# Patient Record
Sex: FEMALE | Race: WHITE | URBAN - METROPOLITAN AREA
[De-identification: names, ages, dates, MRNs, and addresses within clinical notes are randomized per-mention and may not be internally consistent; named-entity substitution may affect disease eponyms.]

---

## 2021-07-29 ENCOUNTER — TELEPHONE ENCOUNTER (OUTPATIENT)
Dept: URBAN - METROPOLITAN AREA CLINIC 92 | Facility: CLINIC | Age: 56
End: 2021-07-29

## 2021-07-29 RX ORDER — ONDANSETRON 8 MG/1
TAKE 1 TABLET (8 MG) PO Q 6 PRN TABLET, ORALLY DISINTEGRATING ORAL
Qty: 20 | Refills: 3 | Status: ACTIVE | COMMUNITY
Start: 2017-05-05 | End: 1900-01-01

## 2021-07-29 RX ORDER — MESALAMINE 1.2 G/1
TAKE 4 TABLETS (4.8 GRAM) BY ORAL ROUTE ONCE DAILY WITH A MEAL FOR 90 DAYS TABLET, DELAYED RELEASE ORAL 1
Qty: 360 | Refills: 3 | Status: ACTIVE | COMMUNITY
Start: 2018-02-08 | End: 1900-01-01

## 2021-07-29 RX ORDER — SODIUM SULFATE, MAGNESIUM SULFATE, AND POTASSIUM CHLORIDE 17.75; 2.7; 2.25 G/1; G/1; G/1
12 TABLETS TABLET ORAL
Qty: 24 TABLETS | Refills: 0 | OUTPATIENT
Start: 2021-07-29 | End: 2021-07-30

## 2021-09-01 LAB
A/G RATIO: 1.7
ALBUMIN: 4.3
ALKALINE PHOSPHATASE: 67
ALT (SGPT): 17
AST (SGOT): 23
BILIRUBIN, TOTAL: 0.4
BUN/CREATININE RATIO: 23
BUN: 16
C-REACTIVE PROTEIN, QUANT: 2
CALCIUM: 9.5
CARBON DIOXIDE, TOTAL: 23
CHLORIDE: 103
CREATININE: 0.7
EGFR IF AFRICN AM: 113
EGFR IF NONAFRICN AM: 98
FERRITIN, SERUM: 40
GLOBULIN, TOTAL: 2.6
GLUCOSE: 94
HEMATOCRIT: 38.1
HEMOGLOBIN: 12.1
IRON BIND.CAP.(TIBC): 325
IRON SATURATION: 22
IRON: 72
MCH: 29.3
MCHC: 31.8
MCV: 92
NRBC: (no result)
PLATELETS: 422
POTASSIUM: 4.8
PROTEIN, TOTAL: 6.9
RBC: 4.13
RDW: 12.8
SODIUM: 140
UIBC: 253
VITAMIN B12: 349
VITAMIN D, 25-HYDROXY: 22.8
WBC: 6.3
ZINC, PLASMA OR SERUM: 108

## 2021-09-14 ENCOUNTER — OFFICE VISIT (OUTPATIENT)
Dept: URBAN - METROPOLITAN AREA CLINIC 98 | Facility: CLINIC | Age: 56
End: 2021-09-14

## 2021-11-04 ENCOUNTER — OFFICE VISIT (OUTPATIENT)
Dept: URBAN - METROPOLITAN AREA CLINIC 98 | Facility: CLINIC | Age: 56
End: 2021-11-04
Payer: COMMERCIAL

## 2021-11-04 ENCOUNTER — WEB ENCOUNTER (OUTPATIENT)
Dept: URBAN - METROPOLITAN AREA CLINIC 98 | Facility: CLINIC | Age: 56
End: 2021-11-04

## 2021-11-04 DIAGNOSIS — K51.00 ULCERATIVE PANCOLITIS WITHOUT COMPLICATION: ICD-10-CM

## 2021-11-04 PROCEDURE — 99214 OFFICE O/P EST MOD 30 MIN: CPT | Performed by: INTERNAL MEDICINE

## 2021-11-04 RX ORDER — ONDANSETRON 8 MG/1
TAKE 1 TABLET (8 MG) PO Q 6 PRN TABLET, ORALLY DISINTEGRATING ORAL
Qty: 20 | Refills: 3 | Status: DISCONTINUED | COMMUNITY
Start: 2017-05-05

## 2021-11-04 RX ORDER — SODIUM, POTASSIUM,MAG SULFATES 17.5-3.13G
354ML SOLUTION, RECONSTITUTED, ORAL ORAL
Qty: 345 MILLILITER | Refills: 0 | OUTPATIENT
Start: 2021-11-07 | End: 2021-11-07

## 2021-11-04 RX ORDER — DICYCLOMINE HYDROCHLORIDE 20 MG/1
1 TABLET TABLET ORAL THREE TIMES A DAY
Qty: 90 | Refills: 3 | OUTPATIENT
Start: 2021-11-07 | End: 2022-03-06

## 2021-11-04 RX ORDER — MESALAMINE 1.2 G/1
2 TABLETS TABLET, DELAYED RELEASE ORAL ONCE A DAY
Qty: 60 | Refills: 3 | OUTPATIENT
Start: 2021-11-07 | End: 2022-03-06

## 2021-11-04 RX ORDER — MESALAMINE 1.2 G/1
TAKE 4 TABLETS (4.8 GRAM) BY ORAL ROUTE ONCE DAILY WITH A MEAL FOR 90 DAYS TABLET, DELAYED RELEASE ORAL 1
Qty: 360 | Refills: 3 | Status: DISCONTINUED | COMMUNITY
Start: 2018-02-08

## 2021-11-04 NOTE — HPI-TODAY'S VISIT:
54 yo pt w chronic pan-ulcerative colitis, on no Rx who has been experiencing intermittent loose, non-bloody stools w/o melenic stools mild intermittent lower abdominal cramping pain, wo fever, chill, night sweats and no anorexia or weight loss. Recent labs 7/21: all normal. She had an episode of N/V and diarrhea last week, which is resolving. Has chronic LBP on Lidoderm qd. She has no other complaints. No COVID-19 exposure and has received 2 doses of COVID-19 vaccine. No Flu vaccine as yet. No other complaints.

## 2021-11-15 ENCOUNTER — TELEPHONE ENCOUNTER (OUTPATIENT)
Dept: URBAN - METROPOLITAN AREA CLINIC 98 | Facility: CLINIC | Age: 56
End: 2021-11-15

## 2021-11-23 ENCOUNTER — ERX REFILL RESPONSE (OUTPATIENT)
Dept: URBAN - METROPOLITAN AREA CLINIC 98 | Facility: CLINIC | Age: 56
End: 2021-11-23

## 2021-11-23 RX ORDER — DICYCLOMINE HYDROCHLORIDE 20 MG/1
TAKE 1 TABLET BY MOUTH THREE TIMES A DAY FOR 30 DAYS TABLET ORAL
Qty: 270 TABLET | Refills: 2 | OUTPATIENT

## 2021-11-23 RX ORDER — DICYCLOMINE HYDROCHLORIDE 20 MG/1
1 TABLET TABLET ORAL THREE TIMES A DAY
Qty: 90 | Refills: 3 | OUTPATIENT

## 2022-11-16 ENCOUNTER — TELEPHONE ENCOUNTER (OUTPATIENT)
Dept: URBAN - METROPOLITAN AREA CLINIC 92 | Facility: CLINIC | Age: 57
End: 2022-11-16

## 2022-11-16 RX ORDER — DICYCLOMINE HYDROCHLORIDE 20 MG/1
TAKE 1 TABLET BY MOUTH THREE TIMES A DAY FOR 30 DAYS TABLET ORAL
Qty: 270 TABLET | Refills: 2 | COMMUNITY

## 2022-11-16 RX ORDER — SODIUM, POTASSIUM,MAG SULFATES 17.5-3.13G
354ML SOLUTION, RECONSTITUTED, ORAL ORAL
Qty: 345 MILLILITER | Refills: 0 | OUTPATIENT
Start: 2022-11-16 | End: 2022-11-17

## 2022-12-02 PROBLEM — 444548001: Status: ACTIVE | Noted: 2021-07-29

## 2022-12-16 ENCOUNTER — OFFICE VISIT (OUTPATIENT)
Dept: URBAN - METROPOLITAN AREA LAB 3 | Facility: LAB | Age: 57
End: 2022-12-16

## 2023-02-03 ENCOUNTER — OFFICE VISIT (OUTPATIENT)
Dept: URBAN - METROPOLITAN AREA LAB 3 | Facility: LAB | Age: 58
End: 2023-02-03
Payer: COMMERCIAL

## 2023-02-03 DIAGNOSIS — K51.00 ACUTE ULCERATIVE PANCOLITIS: ICD-10-CM

## 2023-02-03 PROCEDURE — 45380 COLONOSCOPY AND BIOPSY: CPT | Performed by: INTERNAL MEDICINE

## 2023-02-03 RX ORDER — DICYCLOMINE HYDROCHLORIDE 20 MG/1
TAKE 1 TABLET BY MOUTH THREE TIMES A DAY FOR 30 DAYS TABLET ORAL
Qty: 270 TABLET | Refills: 2 | COMMUNITY

## 2024-04-09 ENCOUNTER — OV EP (OUTPATIENT)
Dept: URBAN - METROPOLITAN AREA CLINIC 98 | Facility: CLINIC | Age: 59
End: 2024-04-09
Payer: COMMERCIAL

## 2024-04-09 ENCOUNTER — OV EP (OUTPATIENT)
Dept: URBAN - METROPOLITAN AREA CLINIC 96 | Facility: CLINIC | Age: 59
End: 2024-04-09
Payer: COMMERCIAL

## 2024-04-09 DIAGNOSIS — K51.00 ULCERATIVE PANCOLITIS WITHOUT COMPLICATION: ICD-10-CM

## 2024-04-09 DIAGNOSIS — K52.9 COLITIS: ICD-10-CM

## 2024-04-09 PROCEDURE — 99214 OFFICE O/P EST MOD 30 MIN: CPT | Performed by: INTERNAL MEDICINE

## 2024-04-09 PROCEDURE — 993 APS NON BILLABLE: Performed by: INTERNAL MEDICINE

## 2024-04-09 PROCEDURE — 992 APS NON BILLABLE: Performed by: INTERNAL MEDICINE

## 2024-04-09 RX ORDER — DICYCLOMINE HYDROCHLORIDE 20 MG/1
TAKE 1 TABLET BY MOUTH THREE TIMES A DAY FOR 30 DAYS TABLET ORAL
Qty: 270 TABLET | Refills: 2 | COMMUNITY

## 2024-04-09 RX ORDER — ALUMINUM ZIRCONIUM OCTACHLOROHYDREX GLY 16 G/100G
AS DIRECTED GEL TOPICAL
Status: ACTIVE | COMMUNITY
Start: 2024-04-09

## 2024-04-09 RX ORDER — PEPPERMINT OIL 90 MG
AS DIRECTED CAPSULE, DELAYED, AND EXTENDED RELEASE ORAL
Status: ACTIVE | COMMUNITY
Start: 2024-04-09

## 2024-04-09 RX ORDER — MESALAMINE 1.2 G/1
2 TABLETS WITH A MEAL TABLET, DELAYED RELEASE ORAL ONCE A DAY
Qty: 180 TABLET | Refills: 3 | OUTPATIENT
Start: 2024-04-09 | End: 2025-04-04

## 2024-04-09 NOTE — HPI-TODAY'S VISIT:
59 yo pt w chronic pan-ulcerative colitis, currently on no Rx who has been experiencing intermittent loose, non-bloody frequent  stools w/o melenic stools, alternating w some formed bms, mild intermittent lower abdominal cramping pain (colonic spams) wo fever, chills, night sweats and no anorexia or weight loss. Has been on IBGard and Align qd. No cardiovascular or urologic sxs. wo anorexia or weight loss. No recent labs. Colonoscopy 2/23: mild inflammation; random colonic bx's wo active colitis. She has developed  trigger finger R-middle finger. She has no other complaints. No COVID-19 exposure and has received 2 doses of COVID-19 vaccine. No Flu vaccine as yet. No other complaints.

## 2025-01-18 ENCOUNTER — TELEPHONE ENCOUNTER (OUTPATIENT)
Dept: URBAN - METROPOLITAN AREA CLINIC 98 | Facility: CLINIC | Age: 60
End: 2025-01-18

## 2025-01-18 RX ORDER — CIPROFLOXACIN HYDROCHLORIDE 250 MG/1
1 TABLET TABLET, FILM COATED ORAL
Qty: 10 TABLET | Refills: 0 | OUTPATIENT
Start: 2025-01-18 | End: 2025-01-23